# Patient Record
Sex: MALE | Race: WHITE | NOT HISPANIC OR LATINO
[De-identification: names, ages, dates, MRNs, and addresses within clinical notes are randomized per-mention and may not be internally consistent; named-entity substitution may affect disease eponyms.]

---
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
## 2024-04-15 ENCOUNTER — NON-APPOINTMENT (OUTPATIENT)
Age: 24
End: 2024-04-15

## 2024-04-15 DIAGNOSIS — M79.673 PAIN IN UNSPECIFIED FOOT: ICD-10-CM

## 2024-04-15 DIAGNOSIS — B07.9 VIRAL WART, UNSPECIFIED: ICD-10-CM

## 2024-04-15 DIAGNOSIS — M77.8 OTHER ENTHESOPATHIES, NOT ELSEWHERE CLASSIFIED: ICD-10-CM

## 2024-12-20 NOTE — ED PROVIDER NOTE - OBJECTIVE STATEMENT
Anesthesia Pre Eval Note    Anesthesia ROS/Med Hx        Anesthetic Complication History:    Patient does not have a history of anesthetic complications      Pulmonary Review:    Positive for sleep apnea     Neuro/Psych Review:  Patient does not have a neuro/psych history         Cardiovascular Review:     Positive for hypertension    GI/HEPATIC/RENAL Review:     Positive for renal disease    End/Other Review:  Positive for diabetes  Positive for obesity class III - 40.00 - 49.99  Positive for arthritis  Positive for chronic pain  Positive for cancer    Overall Review of Systems Comments:  UC, many polyps  Additional Results:      ALLERGIES:   -- Demerol -- THROAT SWELLING   -- Meperidine -- THROAT SWELLING   Last Labs        Component                Value               Date/Time                  WBC                      6.6                 03/08/2024 1032            RBC                      4.49 (L)            03/08/2024 1032            HGB                      14.0                03/08/2024 1032            HCT                      40.8                03/08/2024 1032            MCV                      90.9                03/08/2024 1032            MCH                      31.2                03/08/2024 1032            MCHC                     34.3                03/08/2024 1032            RDW-CV                   14.0                03/08/2024 1032            Sodium                   138                 03/08/2024 1032            Potassium                4.5                 03/08/2024 1032            Chloride                 109                 03/08/2024 1032            Carbon Dioxide           25                  03/08/2024 1032            Glucose                  148 (H)             03/08/2024 1032            BUN                      15                  03/08/2024 1032            Creatinine               0.63 (L)            03/08/2024 1032            Glomerular Filtrati*     >90                 03/08/2024 1032             Calcium                  10.7 (H)            03/08/2024 1032            PLT                      167                 03/08/2024 1032        Past Medical History:  No date: Arthritis  No date: Chronic pain  No date: Colon polyp  No date: Diabetes mellitus  (CMD)  No date: HTN (hypertension)  No date: Malignant neoplasm of prostate  (CMD)  No date: Motion sickness  No date: Sleep apnea      Comment:  CPAP  No date: Ulcerative colitis (CMD)  Past Surgical History:  No date: Appendectomy  06/04/2024: Colonoscopy      Comment:  every 6 months due to adenomas in setting of                pseudopolyposis with hx of ibd  05/21/2021: Prostate biopsy  No date: Tonsillectomy   Prior to Admission medications :  Medication Multiple Vitamins-Minerals (OCUVITE EXTRA PO), Sig Take by mouth daily., Start Date , End Date , Taking? Yes, Authorizing Provider Provider, Outside    Medication tamsulosin (FLOMAX) 0.4 MG Cap, Sig take 2 capsules by mouth daily 30 minutes after dinner, Start Date 8/23/24, End Date , Taking? Yes, Authorizing Provider Rosy Gutierrez MD    Medication baclofen (LIORESAL) 10 MG tablet, Sig TAKE 1 TABLET BY MOUTH AT BEDTIME AS NEEDED (BACK PAIN)., Start Date 8/16/24, End Date , Taking? Yes, Authorizing Provider Rosy Gutierrez MD    Medication lisinopril (ZESTRIL) 20 MG tablet, Sig TAKE 1 TABLET BY MOUTH EVERY DAY, Start Date 8/15/24, End Date , Taking? Yes, Authorizing Provider Rosy Gutierrez MD    Medication traMADol (ULTRAM) 50 MG tablet, Sig TAKE ONE TABLET BY MOUTH EVERY EIGHT HOURS AS NEEDED for pain **do not drive after taking medicine, can cause drowsiness**, Start Date 8/5/24, End Date , Taking? Yes, Authorizing Provider Rosy Gutierrez MD    Medication atorvastatin (LIPITOR) 10 MG tablet, Sig TAKE 1 TABLET BY MOUTH EVERY DAY, Start Date 7/16/24, End Date , Taking? Yes, Authorizing Provider Rosy Gutierrez MD    Medication SITagliptin (Januvia) 100 MG tablet, Sig  TAKE 1 TABLET BY MOUTH EVERY DAY, Start Date 7/13/24, End Date , Taking? Yes, Authorizing Provider Rosy Gutierrez MD    Medication acetaminophen (TYLENOL) 325 MG tablet, Sig Take 650 mg by mouth every 4 hours as needed for Pain., Start Date , End Date , Taking? Yes, Authorizing Provider Provider, Outside    Medication glipiZIDE (GLUCOTROL XL) 5 MG 24 hr tablet, Sig TAKE 1 TABLET BY MOUTH EVERY 12 HOURS, Start Date 4/18/24, End Date , Taking? Yes, Authorizing Provider Rosy Gutierrez MD    Medication turmeric 500 MG capsule, Sig Take 1,000 mg by mouth daily., Start Date , End Date , Taking? Yes, Authorizing Provider Provider, Outside    Medication Coenzyme Q10 (CO Q 10 PO), Sig Take 1 tablet by mouth daily., Start Date , End Date , Taking? Yes, Authorizing Provider Provider, Outside    Medication Omega-3 Fatty Acids (OMEGA 3 PO), Sig Take 1 tablet by mouth daily., Start Date , End Date , Taking? Yes, Authorizing Provider Provider, Outside    Medication B Complex-C (SUPER B COMPLEX PO), Sig Take 1 tablet by mouth daily., Start Date , End Date , Taking? Yes, Authorizing Provider Provider, Outside    Medication cholecalciferol 25 mcg (1,000 units) tablet, Sig TAKE ONE TABLET BY MOUTH ONE TIME DAILY, Start Date 10/11/21, End Date , Taking? Yes, Authorizing Provider Evelin Montana MD    Medication aspirin 81 MG chewable tablet, Sig Chew 81 mg by mouth daily., Start Date , End Date , Taking? Yes, Authorizing Provider Provider, Outside    Medication bisacodyl 5 MG EC tablet, Sig Take 1 tablet by mouth daily as needed for Constipation., Start Date 11/29/24, End Date , Taking? , Authorizing Provider Rosy Gutierrez MD    Medication meclizine (ANTIVERT) 25 MG tablet, Sig TAKE 1 TABLET BY MOUTH 3 TIMES A DAY AS NEEDED FOR DIZZINESS, Start Date 10/3/24, End Date , Taking? , Authorizing Provider Rosy Gutierrez MD    Medication naLOXone (NARCAN) 4 MG/0.1ML nasal liquid, Sig Spray the content of 1  device into 1 nostril. Call 911. May repeat with 2nd device in alternate nostril if no response in 2-3 minutes., Start Date 8/5/24, End Date , Taking? , Authorizing Provider Rosy Gutierrez MD    Medication hydroCORTisone (CORTIZONE) 2.5 % cream, Sig Apply 1 Application topically in the morning and 1 Application in the evening., Start Date , End Date , Taking? , Authorizing Provider Provider, Outside    Medication Procto-Med HC 2.5 % rectal cream, Sig place 1 application rectally 2 times daily as needed for hemorrhoids, Start Date 5/24/24, End Date , Taking? , Authorizing Provider Pauly Woodard MD    Medication blood glucose (Accu-Chek SmartView) test strip, Sig Use to check blood sugar once daily, Start Date 7/12/23, End Date 2/8/28, Taking? , Authorizing Provider Rosy Gutierrez MD    Medication tadalafil (CIALIS) 20 MG tablet, Sig TAKE 1 TABLET BY MOUTH 30-60 MINUTES PRIOR TO SEXUAL ACTIVITY, Start Date 5/10/23, End Date , Taking? , Authorizing Provider Provider, Outside    Medication Multiple Vitamins-Minerals (vitamin - therapeutic multivitamins w/minerals) tablet, Sig Take 1 tablet by mouth daily., Start Date , End Date , Taking? , Authorizing Provider Provider, Outside    Medication Accu-Chek FastClix Lancets Misc, Sig To check blood sugar once daily, Start Date 7/1/22, End Date 2/28/25, Taking? , Authorizing Provider Rosy Gutierrez MD    Medication Accu-Chek FastClix Lancets Misc, Sig Use to check blood sugar once daily, Start Date 6/29/22, End Date , Taking? , Authorizing Provider Rosy Gutierrez MD    Medication blood glucose (Accu-Chek SmartView) test strip, Sig Use to check blood sugar once daily, Start Date 6/29/22, End Date , Taking? , Authorizing Provider Rosy Gutierrez MD    Medication zoster vaccine recomb adjuvanted (SHINGRIX) 50 MCG/0.5ML injection, Sig Inject 0.5 mLs into the muscle 1 time. Repeat dose in 2 to 6 months (unless 1 dose already given), for a total  of 2 doses., Start Date 1/15/21, End Date , Taking? , Authorizing Provider Evelin Montana MD    Medication Mesalamine 800 MG Tablet Enteric Coated, Sig Take 1 tablet by mouth 3 times daily. Indications: Ulcerated Colon, Start Date 10/14/20, End Date 8/5/22, Taking? , Authorizing Provider Pauly Woodard MD         Patient Vitals for the past 24 hrs:   BP Temp Temp src Pulse Resp SpO2 Height Weight   12/20/24 0734 (!) 166/79 36.9 °C (98.4 °F) Oral (!) 56 16 99 % -- --   12/20/24 0726 -- -- -- -- -- -- 5' 11\" (1.803 m) 134.7 kg (297 lb)       Social history reviewed:  Social History     Tobacco Use   Smoking Status Never   Smokeless Tobacco Never        E-Cigarette/Vaping Substances & Devices    E-Cigarette/Vaping Use Never Used     Nicotine No     THC No     CBD No     Flavoring No     Disposable No     Pre-filled or Refillable Cartridge No     Refillable Tank No     Pre-filled Pod No        Social History     Substance and Sexual Activity   Alcohol Use No           Relevant Problems   Anesthesia Problems   (+) Obstructive sleep apnea       Physical Exam     Airway   Mallampati: II  TM Distance: >3 FB  Neck ROM: Full  Neck: Non-tender, Able to place in sniff position, Short and Thick  TMJ Mobility: Good    Cardiovascular  Cardiovascular exam normal  Cardio Rhythm: Regular  Cardio Rate: Normal    Head Assessment  Head assessment: Normocephalic and Atraumatic    General Assessment  General Assessment: Alert and oriented and No acute distress    Dental Exam  Dental exam normal  Patient has:  Poor Dentition and Significant Periodontal Disease    Pulmonary Exam  Pulmonary exam normal  Breath sounds clear to auscultation:  Yes    Abdominal Exam    Patient Demonstrates:  Obese  Visit Vitals  BP (!) 166/79   Pulse (!) 56   Temp 36.9 °C (98.4 °F) (Oral)   Resp 16   Ht 5' 11\" (1.803 m)   Wt 134.7 kg (297 lb)   SpO2 99%   BMI 41.42 kg/m²        Anesthesia Plan:    ASA Status: 3  Anesthesia Type: MAC    Induction:  Intravenous  Preferred Airway Type: Mask  Maintenance: TIVA  Premedication: None      Post-op Pain Management: Per Surgeon      Checklist  Reviewed: NPO Status, Allergies, Medications, Problem list, Past Med History, Patient Summary and Lab Results  Consent/Risks Discussed Statement:  The proposed anesthetic plan, including its risks and benefits, have been discussed with the Patient along with the risks and benefits of alternatives. Questions were encouraged and answered and the patient and/or representative understands and agrees to proceed.    I have discussed elements of the patient's history or examination, as noted above and/or as follows, that place the patient at higher risk of complications; age and BMI> 30 (obesity).    I discussed with the patient (and/or patient's legal representative) the risks and benefits of the proposed anesthesia plan, MAC, which may include services performed by other anesthesia providers.    Alternative anesthesia plans, if available, were reviewed with the patient (and/or patient's legal representative). Discussion has been held with the patient (and/or patient's legal representative) regarding risks of anesthesia, which include Intra-operative Awareness, allergic reaction, anxiety, aspiration, back pain, depressed breathing, emergence delirium, eye injury, headache, hypotension, ICU admit, infection, intra-operative awareness, memory loss, nausea, need for blood transfusion, nerve injury, oral injury, organ damage, persistent pain, post-op intubation, sore throat, vomiting, bleeding/hematoma, conversion to general anesthesia, dental injury and death and emergent situations that may require change in anesthesia plan.    The patient (and/or patient's legal representative) has indicated understanding, his/her questions have been answered, and he/she wishes to proceed with the planned anesthetic.    Blood Products: Not Anticipated  DNR status was reviewed with patient/guardian.: The  patient or designated surrogate may request the full suspension of existing directives during the anesthetic and immediate postoperative period, thereby consenting to the use of any resuscitation procedures that may be appropriate..     Notes: Risks and benefits discussed.     Risks and benefits discussed, I have informed the named patient or the patient's legally authorized representative of the anesthetic methods for the proposed surgery.  I have explained the nature and purpose of the anesthesia, the reasonable alternatives, risks/complications, dental damage, loss of airway, and consequences.  I have stated an alternative form of anesthesia may be used if unexpected conditions arise before or during the procedure.  I have given the opportunity to ask questions and answered any such questions.  I have explained the risk of awareness, especially under sedation..       woke up this morning c/o right sided testicular pain. no trauma, no dysuria, no rashes, no discharge. no color changes. had some rlq pain, no vomiting.   allergic to penicillin  no other [facility] p[facility] allergies or meds  iona orourke